# Patient Record
(demographics unavailable — no encounter records)

---

## 2025-04-16 NOTE — ASSESSMENT
[Vaccines Reviewed] : Immunizations reviewed today. Please see immunization details in the vaccine log within the immunization flowsheet.  [FreeTextEntry1] : - Diet and exercise discussed - f/u labs  - Pap UTD - Tdap UTD per pt has been less than 10 yrs since she received

## 2025-04-16 NOTE — HEALTH RISK ASSESSMENT
[Good] : ~his/her~  mood as  good [1] : 2) Feeling down, depressed, or hopeless for several days (1) [Never] : Never [NO] : No [PHQ-2 Negative - No further assessment needed] : PHQ-2 Negative - No further assessment needed [HIV Test offered] : HIV Test offered [Hepatitis C test offered] : Hepatitis C test offered [Fully functional (bathing, dressing, toileting, transferring, walking, feeding)] : Fully functional (bathing, dressing, toileting, transferring, walking, feeding) [Fully functional (using the telephone, shopping, preparing meals, housekeeping, doing laundry, using] : Fully functional and needs no help or supervision to perform IADLs (using the telephone, shopping, preparing meals, housekeeping, doing laundry, using transportation, managing medications and managing finances) [Reports normal functional visual acuity (ie: able to read med bottle)] : Reports normal functional visual acuity [No] : In the past 12 months have you used drugs other than those required for medical reasons? No [Patient reported PAP Smear was normal] : Patient reported PAP Smear was normal [With Significant Other] : lives with significant other [Employed] : employed [Significant Other] : lives with significant other [Feels Safe at Home] : Feels safe at home [FreeTextEntry1] : migraines, pre diabetic, weight gain [de-identified] : walking 10,000 steps a day.  [de-identified] : Watching carb intake and more whole grains  [[id number]] : 2 [Reports changes in hearing] : Reports no changes in hearing [Reports changes in vision] : Reports no changes in vision [Reports changes in dental health] : Reports no changes in dental health [PapSmearDate] : 09/22 [FreeTextEntry2] :  [de-identified] : wears glasses

## 2025-04-16 NOTE — HISTORY OF PRESENT ILLNESS
[FreeTextEntry1] : Physical exam [de-identified] : Patient is a 33-year-old female presents to clinic today for physical exam.  Patient is new to provider to clinic today.  Last physical was last yr   1. Migraines -Sometimes triggered by smell -light and sound sensitive -Usually felt first back of head prior to spreading to pain around whole head/behind eyes -described pain as pounding/squeezing -Now Migraines usually once every three mos. Last time she had was last month but not as intense -Associated with Nausea as well  -Not taking any medication to help abort - sometimes would take Tylenol/Excedrin which helps

## 2025-04-16 NOTE — PAST MEDICAL HISTORY
[Menstruating] : menstruating [Irregular Cycle Intervals] : are  irregular [Total Preg ___] : G[unfilled] [FreeTextEntry1] : IUD in place